# Patient Record
Sex: FEMALE | Race: OTHER | URBAN - METROPOLITAN AREA
[De-identification: names, ages, dates, MRNs, and addresses within clinical notes are randomized per-mention and may not be internally consistent; named-entity substitution may affect disease eponyms.]

---

## 2023-07-28 ENCOUNTER — INPATIENT (INPATIENT)
Facility: HOSPITAL | Age: 68
LOS: 1 days | Discharge: HOME | DRG: 872 | End: 2023-07-30
Attending: STUDENT IN AN ORGANIZED HEALTH CARE EDUCATION/TRAINING PROGRAM | Admitting: STUDENT IN AN ORGANIZED HEALTH CARE EDUCATION/TRAINING PROGRAM
Payer: COMMERCIAL

## 2023-07-28 VITALS
DIASTOLIC BLOOD PRESSURE: 69 MMHG | RESPIRATION RATE: 18 BRPM | HEART RATE: 90 BPM | SYSTOLIC BLOOD PRESSURE: 105 MMHG | WEIGHT: 136.03 LBS | OXYGEN SATURATION: 97 % | TEMPERATURE: 98 F | HEIGHT: 62 IN

## 2023-07-28 DIAGNOSIS — Z90.49 ACQUIRED ABSENCE OF OTHER SPECIFIED PARTS OF DIGESTIVE TRACT: Chronic | ICD-10-CM

## 2023-07-28 DIAGNOSIS — F41.9 ANXIETY DISORDER, UNSPECIFIED: ICD-10-CM

## 2023-07-28 DIAGNOSIS — R10.9 UNSPECIFIED ABDOMINAL PAIN: ICD-10-CM

## 2023-07-28 DIAGNOSIS — N12 TUBULO-INTERSTITIAL NEPHRITIS, NOT SPECIFIED AS ACUTE OR CHRONIC: ICD-10-CM

## 2023-07-28 DIAGNOSIS — A41.9 SEPSIS, UNSPECIFIED ORGANISM: ICD-10-CM

## 2023-07-28 DIAGNOSIS — Z98.891 HISTORY OF UTERINE SCAR FROM PREVIOUS SURGERY: Chronic | ICD-10-CM

## 2023-07-28 DIAGNOSIS — E87.1 HYPO-OSMOLALITY AND HYPONATREMIA: ICD-10-CM

## 2023-07-28 DIAGNOSIS — Z29.9 ENCOUNTER FOR PROPHYLACTIC MEASURES, UNSPECIFIED: ICD-10-CM

## 2023-07-28 DIAGNOSIS — N30.00 ACUTE CYSTITIS WITHOUT HEMATURIA: ICD-10-CM

## 2023-07-28 LAB
ALBUMIN SERPL ELPH-MCNC: 3.8 G/DL — SIGNIFICANT CHANGE UP (ref 3.3–5)
ALP SERPL-CCNC: 73 U/L — SIGNIFICANT CHANGE UP (ref 40–120)
ALT FLD-CCNC: 13 U/L — SIGNIFICANT CHANGE UP (ref 10–45)
ANION GAP SERPL CALC-SCNC: 12 MMOL/L — SIGNIFICANT CHANGE UP (ref 5–17)
ANION GAP SERPL CALC-SCNC: 8 MMOL/L — SIGNIFICANT CHANGE UP (ref 5–17)
ANISOCYTOSIS BLD QL: SLIGHT — SIGNIFICANT CHANGE UP
APPEARANCE UR: ABNORMAL
AST SERPL-CCNC: 22 U/L — SIGNIFICANT CHANGE UP (ref 10–40)
BACTERIA # UR AUTO: ABNORMAL /HPF
BASOPHILS # BLD AUTO: 0.13 K/UL — SIGNIFICANT CHANGE UP (ref 0–0.2)
BASOPHILS NFR BLD AUTO: 0.9 % — SIGNIFICANT CHANGE UP (ref 0–2)
BILIRUB SERPL-MCNC: 0.6 MG/DL — SIGNIFICANT CHANGE UP (ref 0.2–1.2)
BILIRUB UR-MCNC: NEGATIVE — SIGNIFICANT CHANGE UP
BUN SERPL-MCNC: 9 MG/DL — SIGNIFICANT CHANGE UP (ref 7–23)
BUN SERPL-MCNC: 9 MG/DL — SIGNIFICANT CHANGE UP (ref 7–23)
BURR CELLS BLD QL SMEAR: PRESENT — SIGNIFICANT CHANGE UP
CALCIUM SERPL-MCNC: 8.1 MG/DL — LOW (ref 8.4–10.5)
CALCIUM SERPL-MCNC: 8.8 MG/DL — SIGNIFICANT CHANGE UP (ref 8.4–10.5)
CHLORIDE SERPL-SCNC: 109 MMOL/L — HIGH (ref 96–108)
CHLORIDE SERPL-SCNC: 94 MMOL/L — LOW (ref 96–108)
CO2 SERPL-SCNC: 23 MMOL/L — SIGNIFICANT CHANGE UP (ref 22–31)
CO2 SERPL-SCNC: 23 MMOL/L — SIGNIFICANT CHANGE UP (ref 22–31)
COLOR SPEC: YELLOW — SIGNIFICANT CHANGE UP
CREAT SERPL-MCNC: 0.75 MG/DL — SIGNIFICANT CHANGE UP (ref 0.5–1.3)
CREAT SERPL-MCNC: 0.89 MG/DL — SIGNIFICANT CHANGE UP (ref 0.5–1.3)
DIFF PNL FLD: ABNORMAL
EGFR: 70 ML/MIN/1.73M2 — SIGNIFICANT CHANGE UP
EGFR: 86 ML/MIN/1.73M2 — SIGNIFICANT CHANGE UP
EOSINOPHIL # BLD AUTO: 0 K/UL — SIGNIFICANT CHANGE UP (ref 0–0.5)
EOSINOPHIL NFR BLD AUTO: 0 % — SIGNIFICANT CHANGE UP (ref 0–6)
EPI CELLS # UR: SIGNIFICANT CHANGE UP /HPF (ref 0–5)
GIANT PLATELETS BLD QL SMEAR: PRESENT — SIGNIFICANT CHANGE UP
GLUCOSE SERPL-MCNC: 102 MG/DL — HIGH (ref 70–99)
GLUCOSE SERPL-MCNC: 121 MG/DL — HIGH (ref 70–99)
GLUCOSE UR QL: NEGATIVE — SIGNIFICANT CHANGE UP
HCT VFR BLD CALC: 36.1 % — SIGNIFICANT CHANGE UP (ref 34.5–45)
HGB BLD-MCNC: 12.3 G/DL — SIGNIFICANT CHANGE UP (ref 11.5–15.5)
KETONES UR-MCNC: NEGATIVE — SIGNIFICANT CHANGE UP
LACTATE SERPL-SCNC: 1.9 MMOL/L — SIGNIFICANT CHANGE UP (ref 0.5–2)
LEUKOCYTE ESTERASE UR-ACNC: ABNORMAL
LIDOCAIN IGE QN: 21 U/L — SIGNIFICANT CHANGE UP (ref 7–60)
LYMPHOCYTES # BLD AUTO: 15.8 % — SIGNIFICANT CHANGE UP (ref 13–44)
LYMPHOCYTES # BLD AUTO: 2.32 K/UL — SIGNIFICANT CHANGE UP (ref 1–3.3)
MACROCYTES BLD QL: SLIGHT — SIGNIFICANT CHANGE UP
MANUAL SMEAR VERIFICATION: SIGNIFICANT CHANGE UP
MCHC RBC-ENTMCNC: 32.3 PG — SIGNIFICANT CHANGE UP (ref 27–34)
MCHC RBC-ENTMCNC: 34.1 GM/DL — SIGNIFICANT CHANGE UP (ref 32–36)
MCV RBC AUTO: 94.8 FL — SIGNIFICANT CHANGE UP (ref 80–100)
MONOCYTES # BLD AUTO: 2.32 K/UL — HIGH (ref 0–0.9)
MONOCYTES NFR BLD AUTO: 15.8 % — HIGH (ref 2–14)
NEUTROPHILS # BLD AUTO: 9.9 K/UL — HIGH (ref 1.8–7.4)
NEUTROPHILS NFR BLD AUTO: 67.5 % — SIGNIFICANT CHANGE UP (ref 43–77)
NITRITE UR-MCNC: NEGATIVE — SIGNIFICANT CHANGE UP
OSMOLALITY SERPL: 285 MOSM/KG — SIGNIFICANT CHANGE UP (ref 280–301)
OVALOCYTES BLD QL SMEAR: SLIGHT — SIGNIFICANT CHANGE UP
PH UR: 6 — SIGNIFICANT CHANGE UP (ref 5–8)
PLAT MORPH BLD: ABNORMAL
PLATELET # BLD AUTO: 254 K/UL — SIGNIFICANT CHANGE UP (ref 150–400)
POIKILOCYTOSIS BLD QL AUTO: SLIGHT — SIGNIFICANT CHANGE UP
POTASSIUM SERPL-MCNC: 4.4 MMOL/L — SIGNIFICANT CHANGE UP (ref 3.5–5.3)
POTASSIUM SERPL-MCNC: 4.5 MMOL/L — SIGNIFICANT CHANGE UP (ref 3.5–5.3)
POTASSIUM SERPL-SCNC: 4.4 MMOL/L — SIGNIFICANT CHANGE UP (ref 3.5–5.3)
POTASSIUM SERPL-SCNC: 4.5 MMOL/L — SIGNIFICANT CHANGE UP (ref 3.5–5.3)
PROT SERPL-MCNC: 7.5 G/DL — SIGNIFICANT CHANGE UP (ref 6–8.3)
PROT UR-MCNC: 30 MG/DL
RBC # BLD: 3.81 M/UL — SIGNIFICANT CHANGE UP (ref 3.8–5.2)
RBC # FLD: 13 % — SIGNIFICANT CHANGE UP (ref 10.3–14.5)
RBC BLD AUTO: ABNORMAL
RBC CASTS # UR COMP ASSIST: < 5 /HPF — SIGNIFICANT CHANGE UP
SODIUM SERPL-SCNC: 129 MMOL/L — LOW (ref 135–145)
SODIUM SERPL-SCNC: 140 MMOL/L — SIGNIFICANT CHANGE UP (ref 135–145)
SP GR SPEC: 1.01 — SIGNIFICANT CHANGE UP (ref 1–1.03)
UROBILINOGEN FLD QL: 2 E.U./DL
WBC # BLD: 14.67 K/UL — HIGH (ref 3.8–10.5)
WBC # FLD AUTO: 14.67 K/UL — HIGH (ref 3.8–10.5)
WBC UR QL: ABNORMAL /HPF

## 2023-07-28 PROCEDURE — 99285 EMERGENCY DEPT VISIT HI MDM: CPT

## 2023-07-28 PROCEDURE — 74177 CT ABD & PELVIS W/CONTRAST: CPT | Mod: 26,MA

## 2023-07-28 PROCEDURE — 99222 1ST HOSP IP/OBS MODERATE 55: CPT | Mod: GC

## 2023-07-28 RX ORDER — FLUOXETINE HCL 10 MG
20 CAPSULE ORAL DAILY
Refills: 0 | Status: DISCONTINUED | OUTPATIENT
Start: 2023-07-28 | End: 2023-07-30

## 2023-07-28 RX ORDER — CEFTRIAXONE 500 MG/1
1000 INJECTION, POWDER, FOR SOLUTION INTRAMUSCULAR; INTRAVENOUS ONCE
Refills: 0 | Status: COMPLETED | OUTPATIENT
Start: 2023-07-28 | End: 2023-07-28

## 2023-07-28 RX ORDER — SODIUM CHLORIDE 9 MG/ML
1000 INJECTION INTRAMUSCULAR; INTRAVENOUS; SUBCUTANEOUS ONCE
Refills: 0 | Status: COMPLETED | OUTPATIENT
Start: 2023-07-28 | End: 2023-07-28

## 2023-07-28 RX ORDER — ACETAMINOPHEN 500 MG
650 TABLET ORAL EVERY 6 HOURS
Refills: 0 | Status: DISCONTINUED | OUTPATIENT
Start: 2023-07-28 | End: 2023-07-30

## 2023-07-28 RX ORDER — CEFTRIAXONE 500 MG/1
1000 INJECTION, POWDER, FOR SOLUTION INTRAMUSCULAR; INTRAVENOUS EVERY 24 HOURS
Refills: 0 | Status: DISCONTINUED | OUTPATIENT
Start: 2023-07-29 | End: 2023-07-30

## 2023-07-28 RX ORDER — ACETAMINOPHEN 500 MG
1000 TABLET ORAL ONCE
Refills: 0 | Status: COMPLETED | OUTPATIENT
Start: 2023-07-28 | End: 2023-07-28

## 2023-07-28 RX ORDER — IBUPROFEN 200 MG
600 TABLET ORAL ONCE
Refills: 0 | Status: COMPLETED | OUTPATIENT
Start: 2023-07-28 | End: 2023-07-28

## 2023-07-28 RX ORDER — ENOXAPARIN SODIUM 100 MG/ML
40 INJECTION SUBCUTANEOUS EVERY 24 HOURS
Refills: 0 | Status: DISCONTINUED | OUTPATIENT
Start: 2023-07-28 | End: 2023-07-30

## 2023-07-28 RX ORDER — SODIUM CHLORIDE 9 MG/ML
1000 INJECTION, SOLUTION INTRAVENOUS ONCE
Refills: 0 | Status: COMPLETED | OUTPATIENT
Start: 2023-07-28 | End: 2023-07-28

## 2023-07-28 RX ORDER — METRONIDAZOLE 500 MG
500 TABLET ORAL ONCE
Refills: 0 | Status: COMPLETED | OUTPATIENT
Start: 2023-07-28 | End: 2023-07-28

## 2023-07-28 RX ORDER — IOHEXOL 300 MG/ML
30 INJECTION, SOLUTION INTRAVENOUS ONCE
Refills: 0 | Status: COMPLETED | OUTPATIENT
Start: 2023-07-28 | End: 2023-07-28

## 2023-07-28 RX ADMIN — SODIUM CHLORIDE 1000 MILLILITER(S): 9 INJECTION, SOLUTION INTRAVENOUS at 14:27

## 2023-07-28 RX ADMIN — SODIUM CHLORIDE 1000 MILLILITER(S): 9 INJECTION INTRAMUSCULAR; INTRAVENOUS; SUBCUTANEOUS at 13:12

## 2023-07-28 RX ADMIN — Medication 600 MILLIGRAM(S): at 15:18

## 2023-07-28 RX ADMIN — SODIUM CHLORIDE 1000 MILLILITER(S): 9 INJECTION INTRAMUSCULAR; INTRAVENOUS; SUBCUTANEOUS at 11:24

## 2023-07-28 RX ADMIN — Medication 400 MILLIGRAM(S): at 11:24

## 2023-07-28 RX ADMIN — Medication 100 MILLIGRAM(S): at 14:36

## 2023-07-28 RX ADMIN — SODIUM CHLORIDE 1000 MILLILITER(S): 9 INJECTION, SOLUTION INTRAVENOUS at 15:18

## 2023-07-28 RX ADMIN — CEFTRIAXONE 1000 MILLIGRAM(S): 500 INJECTION, POWDER, FOR SOLUTION INTRAMUSCULAR; INTRAVENOUS at 14:55

## 2023-07-28 RX ADMIN — SODIUM CHLORIDE 1000 MILLILITER(S): 9 INJECTION INTRAMUSCULAR; INTRAVENOUS; SUBCUTANEOUS at 11:23

## 2023-07-28 RX ADMIN — Medication 1000 MILLIGRAM(S): at 13:11

## 2023-07-28 RX ADMIN — Medication 500 MILLIGRAM(S): at 15:18

## 2023-07-28 RX ADMIN — IOHEXOL 30 MILLILITER(S): 300 INJECTION, SOLUTION INTRAVENOUS at 11:24

## 2023-07-28 RX ADMIN — Medication 600 MILLIGRAM(S): at 14:27

## 2023-07-28 RX ADMIN — CEFTRIAXONE 100 MILLIGRAM(S): 500 INJECTION, POWDER, FOR SOLUTION INTRAMUSCULAR; INTRAVENOUS at 13:58

## 2023-07-28 NOTE — H&P ADULT - HISTORY OF PRESENT ILLNESS
68 F with no PMHx presenting with 3 days of abd pain associated with diarrhea and fatigue found to have uretritis and pyelonephritis. Three days ago, patient developed diffuse intermittent crampy pain associated with lower back pain that does not radiate to either side. She had two episodes of non-bloody diarrhea that resolved with immodium. She felt feverish at home and took her temperature to 38C-39C (100.4-102.2F). More recently developed fatigue and decreased appetite. She went to her PCP Dr. Arie Domínguez who sent her to ED for evaluation.  Denies chest pain, SOB, N/V, dysuria, urinary frequency, sick contacts.    Of note, patient moved from [facility] to [facility] one year ago and was prescribed alprazolam 0.25 qAM and fluoxetine 20mg qD in [place] but does not have an official diagnosis of depression. She also has history of kidney infection at age 13.    In the ED:  Initial vital signs: T: 98.3, 105/69, HR 90, RR 18, 97% on RA. Later BP 90/55 and T100.6  Labs: significant for WBC 14.7, Hgb 12.3, Na 129, Cr 0.89, lactate 1.9, UA infectious  Imaging: CT with R ureteritis/pyelonephritis, incidental L renal angiomylipoma  Medications: CTX x1, flagyl x1, IV tylenol, motrin, 2L NS, 1L LR  Consults: none    68 F with no PMHx presenting with 3 days of abd pain associated with diarrhea and fatigue found to have uretritis and pyelonephritis. Three days ago, patient developed diffuse intermittent crampy pain associated with lower back pain that does not radiate to either side. She had two episodes of non-bloody diarrhea that resolved with immodium. She felt feverish at home and took her temperature to 38C-39C (100.4-102.2F). More recently developed fatigue and decreased appetite. She went to her PCP Dr. Arie Domínguez who sent her to ED for evaluation.  Denies chest pain, SOB, N/V, dysuria, urinary frequency, sick contacts.    Of note, patient moved from [facility] to [facility] one year ago and was prescribed alprazolam 0.25 qAM and fluoxetine 20mg qD in [place] but does not have an official diagnosis of depression. She also has history of kidney infection at age 13.    In the ED:  Initial vital signs: T: 98.3, 105/69, HR 90, RR 18, 97% on RA. Later BP 90/55 and T100.6  Labs: significant for WBC 14.7, Hgb 12.3, Na 129, Cr 0.89, lactate 1.9, UA infectious  Imaging: CT with R ureteritis/pyelonephritis, incidental L renal angiomylipoma  Medications: CTX 1g x1, flagyl x1, IV tylenol, motrin, 2L NS, 1L LR  Consults: none    68 F with no PMHx presenting with 3 days of abd pain associated with diarrhea and fatigue found to have uretritis and pyelonephritis. Three days ago, patient developed diffuse intermittent crampy pain associated with lower back pain that does not radiate to either side. She had two episodes of non-bloody diarrhea that resolved with immodium. She felt feverish at home and took her temperature to 38C-39C (100.4-102.2F). More recently developed fatigue and decreased appetite. She went to her PCP Dr. Arie Domínguez who sent her to ED for evaluation.  Denies chest pain, SOB, N/V, dysuria, urinary frequency, sick contacts.    Of note, patient moved from [facility] to [facility] one year ago and was prescribed alprazolam 0.25 qAM and fluoxetine 20mg qD in [place] but does not have an official diagnosis of depression. She also has history of kidney infection at age 13. Per patient, she has history of soft blood pressures.    In the ED:  Initial vital signs: T: 98.3, 105/69, HR 90, RR 18, 97% on RA. Later BP 90/55 and T100.6  Labs: significant for WBC 14.7, Hgb 12.3, Na 129, Cr 0.89, lactate 1.9, UA infectious  Imaging: CT with R ureteritis/pyelonephritis, incidental L renal angiomylipoma  Medications: CTX 1g x1, flagyl x1, IV tylenol, motrin, 2L NS, 1L LR  Consults: none

## 2023-07-28 NOTE — H&P ADULT - PROBLEM SELECTOR PLAN 3
Na 127 on admission. Patient reporting poor PO intake. Euvolemic exam. Calculated sOsms 268, c/w hypotonic hyponatremia. Ddx: hypovolemic iso poor PO intake vs. SIADH.     Plan:  - F/u urine osms, urine lytes, serum osms  - F/u repeat BMP after IVF in ED Na 127 on admission. Patient reporting poor PO intake. Euvolemic exam. Calculated sOsms 268, c/w hypotonic hyponatremia. Ddx: hypovolemic iso poor PO intake vs. SIADH.     Plan:  - F/u urine osms, urine lytes  - F/u repeat 6p BMP and serum osms after IVF in ED Na 127 on admission. Patient reporting poor PO intake. Euvolemic exam. Calculated sOsms 268, c/w hypotonic hyponatremia. Ddx: hypovolemic iso poor PO intake and diarrheal losses vs. SIADH (can be associated with fluoxetine).     Plan:  - F/u urine osms, urine lytes  - F/u repeat 6p BMP and serum osms after IVF in ED Na 127 on admission. Patient reporting poor PO intake. Euvolemic exam. Calculated sOsms 268, c/w hypotonic hyponatremia. Ddx: hypovolemic iso poor PO intake and diarrheal losses vs. SIADH (can be associated with fluoxetine).     Plan:  - F/u urine osms, urine lytes  - F/u repeat 8p BMP and serum osms after IVF in ED

## 2023-07-28 NOTE — ED ADULT NURSE NOTE - NS ED NURSE REPORT GIVEN TO FT
Geno SHARMA erho/ report given to Melina SHARMA by Chely SHARMA Mirvaso Counseling: Mirvaso is a topical medication which can decrease superficial blood flow where applied. Side effects are uncommon and include stinging, redness and allergic reactions.

## 2023-07-28 NOTE — ED PROVIDER NOTE - OBJECTIVE STATEMENT
68 year old female, no reported pmh,  x 2, presenting with lower abdominal pain x 3d. States having b/l lower quadrant pain with associated low back pain, fever, and diarrhea. Denies CP, cough, sob, n/v, bloody stools, recent travel, sick contacts, dysuria. Did not take anything PTA. Was seen at PMD's office and told to come to ER to r/o diverticulitis.

## 2023-07-28 NOTE — H&P ADULT - PROBLEM SELECTOR PLAN 4
Patient with infectious UA but no urinary symptoms.    Plan:  -See above Patient with 3 days of crampy intermittent abdominal pain, assoc with lower back pain and R>L CVA tenderness. Most likely 2/2 to pyelonephritis. Lower concern for: pancreatitis (negative lipase), cystitis (no urinary symptoms, no suprapubic tenderness), kidney stone (not seen on imaging).     Plan:  - See above

## 2023-07-28 NOTE — H&P ADULT - NSHPSOCIALHISTORY_GEN_ALL_CORE
Traveled from [place] to [place] one year prior to be with her kids/grand kids.  Lives alone  Recently started working again in fashion    Non-smoker, social EtOH, no drugs

## 2023-07-28 NOTE — ED PROVIDER NOTE - ATTENDING APP SHARED VISIT CONTRIBUTION OF CARE
I have reviewed and agree with all pertinent clinical information, including history and physical exam and agree with treatment plan of the PA/NP    I saw the patient at bedside and contributed to the HPI/PE/MDM

## 2023-07-28 NOTE — H&P ADULT - NSHPPHYSICALEXAM_GEN_ALL_CORE
.  VITAL SIGNS:  T(C): 36.7 (07-28-23 @ 15:32), Max: 38.1 (07-28-23 @ 13:54)  T(F): 98.1 (07-28-23 @ 15:32), Max: 100.6 (07-28-23 @ 13:54)  HR: 86 (07-28-23 @ 13:48) (86 - 90)  BP: 96/60 (07-28-23 @ 14:38) (90/55 - 105/69)  BP(mean): --  RR: 18 (07-28-23 @ 13:48) (18 - 18)  SpO2: 98% (07-28-23 @ 13:48) (97% - 98%)  Wt(kg): --    PHYSICAL EXAM:    Constitutional: NAD  Eyes: PERRL, EOMI, anicteric sclera  ENT: no nasal discharge; uvula midline, no oropharyngeal erythema or exudates; MMM  Neck: supple; no JVD or thyromegaly  Respiratory: CTA B/L; normal wob, no retractions  Cardiac: +S1/S2; RRR; no M/R/G; PMI non-displaced  Gastrointestinal: soft, mild diffuse tenderness in epigastrium  Back: spine midline, no bony tenderness or step-offs; R>L CVA tenderness  Extremities: WWP, no clubbing or cyanosis; no peripheral edema  Musculoskeletal: NROM x4; no joint swelling, tenderness or erythema  Vascular: 2+ radial, femoral, DP/PT pulses B/L  Dermatologic: skin warm, dry and intact; no rashes, wounds, or scars  Lymphatic: no submandibular or cervical LAD  Neurologic: AAOx3; CNII-XII grossly intact; no focal deficits  Psychiatric: affect and characteristics of appearance, verbalizations, behaviors are appropriate

## 2023-07-28 NOTE — ED ADULT NURSE NOTE - PAIN: BODY LOCATION
abdominal pain/generalized
no back pain, no gout, no musculoskeletal pain, no neck pain, and no weakness.

## 2023-07-28 NOTE — ED ADULT NURSE NOTE - OBJECTIVE STATEMENT
Pt is 68 y.o female pt walked in c/o generalized abdominal pain, fever, diarrhea x 3 days. Denies chest pain, shortness of breath, dizziness, n/v/d. Pt afebrile upon arrival. Pt A&oX4. Pt is conversive in full sentences and has a steady gait. IV inserted and labs sent. Denies any recent or past abdominal surgery aside for C- section. Assessment ongoing. Will cont to monitor.

## 2023-07-28 NOTE — ED PROVIDER NOTE - PROGRESS NOTE DETAILS
pt meeting sepsis criteria as now febrile, bp low 90/50s. already received wt based fluiids but no contraindication to more hydration.  already given ceftriaxone for uti and iv tylenol for pain.  pending CTr.   will give motrin and obtain cultures as well pt meeting sepsis criteria as now febrile, bp low 90/50s.  neg lactate.  already received wt based fluids but no contraindication to more hydration.  already given ceftriaxone for uti and iv tylenol for pain.  will give motrin, flagyl and obtain cultures pending CTr. CT w/ R ureteritis/pyelo, incidental L renal angiomylipoma.  will admit to medicine for further management

## 2023-07-28 NOTE — ED ADULT NURSE NOTE - NSFALLUNIVINTERV_ED_ALL_ED
Bed/Stretcher in lowest position, wheels locked, appropriate side rails in place/Call bell, personal items and telephone in reach/Instruct patient to call for assistance before getting out of bed/chair/stretcher/Non-slip footwear applied when patient is off stretcher/[place] to call system/Physically safe environment - no spills, clutter or unnecessary equipment/Purposeful proactive rounding/Room/bathroom lighting operational, light cord in reach

## 2023-07-28 NOTE — H&P ADULT - PROBLEM SELECTOR PLAN 7
Fluids: S/p 3L IVF in ED  Electrolytes: replete to K>4 or Mg>2  Diet: dash  DVT PPx: Lovenox 40mg QD  GI PPx:   Code: Full  Dispo: [facility] then home

## 2023-07-28 NOTE — H&P ADULT - NSHPLABSRESULTS_GEN_ALL_CORE
LABS:                         12.3   14.67 )-----------( 254      ( 28 Jul 2023 11:28 )             36.1     07-28    129<L>  |  94<L>  |  9   ----------------------------<  121<H>  4.4   |  23  |  0.89    Ca    8.8      28 Jul 2023 11:28    TPro  7.5  /  Alb  3.8  /  TBili  0.6  /  DBili  x   /  AST  22  /  ALT  13  /  AlkPhos  73  07-28      Urinalysis Basic - ( 28 Jul 2023 11:28 )    Color: x / Appearance: x / SG: x / pH: x  Gluc: 121 mg/dL / Ketone: x  / Bili: x / Urobili: x   Blood: x / Protein: x / Nitrite: x   Leuk Esterase: x / RBC: x / WBC x   Sq Epi: x / Non Sq Epi: x / Bacteria: x            Lactate, Blood: 1.9 mmol/L (07-28 @ 11:28)      RADIOLOGY, EKG & ADDITIONAL TESTS:   IMPRESSION:  1. Acute right pyelonephritis and ureteritis. No abscess.  2. 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.) LABS:                         12.3   14.67 )-----------( 254      ( 28 Jul 2023 11:28 )             36.1     07-28    129<L>  |  94<L>  |  9   ----------------------------<  121<H>  4.4   |  23  |  0.89    Ca    8.8      28 Jul 2023 11:28    TPro  7.5  /  Alb  3.8  /  TBili  0.6  /  DBili  x   /  AST  22  /  ALT  13  /  AlkPhos  73  07-28      Urinalysis Basic - ( 28 Jul 2023 11:28 )    Color: x / Appearance: x / SG: x / pH: x  Gluc: 121 mg/dL / Ketone: x  / Bili: x / Urobili: x   Blood: x / Protein: x / Nitrite: x   Leuk Esterase: x / RBC: x / WBC x   Sq Epi: x / Non Sq Epi: x / Bacteria: x      Lactate, Blood: 1.9 mmol/L (07-28 @ 11:28)      RADIOLOGY, EKG & ADDITIONAL TESTS:   IMPRESSION:  1. Acute right pyelonephritis and ureteritis. No abscess.  2. 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

## 2023-07-28 NOTE — ED PROVIDER NOTE - CLINICAL SUMMARY MEDICAL DECISION MAKING FREE TEXT BOX
MD Deni: 68 year old female, no reported pmh,  x 2, presenting with lower abdominal pain x 3d, BP mildly soft here but vitals otherwise reassuring, patient is very well appearing somewhat emotional/labile in setting of anxiety, mentating fully overall, good perfusion throughout, abdomen soft/non-peritoneal despite tenderness to palpation in lower quadrants. Will need CT imaging to r/o acute diverticulitis vs colitis vs abscess, low suspicion for obstruction vs cholecystitis vs pancreatitis vs ACS vs PE. Likely viral in etiology. Will reassess pending labs/urine/imaging. Doubt surgical pathology.

## 2023-07-28 NOTE — H&P ADULT - PROBLEM SELECTOR PLAN 2
Patient with R>L CVA tenderness and pyelonephritis on imaging.     Plan:  -See above  -Will need outpatient f/u for incidental L renal angiomylipoma

## 2023-07-28 NOTE — ED ADULT NURSE NOTE - CHPI ED NUR SYMPTOMS POS
What Is The Reason For Today's Visit?: History of Melanoma Year Excised?: 2003 [place] Depth?: 0.69 PAIN

## 2023-07-28 NOTE — ED PROVIDER NOTE - PHYSICAL EXAMINATION
Gen - NAD; well-appearing but anxious/tearful at times; A+Ox3   HEENT - NCAT, EOMI, moist mucous membranes, clear oropharynx  Neck - supple  Resp - CTAB, no increased WOB  CV -  RRR, no m/r/g  Abd - soft, nondistended, TTP b/l lower quadrants, no guarding or rebound  Back - inconsistent R>L CVA tenderness  MSK - FROM of b/l UE and LE, no gross deformities  Extrem - no LE edema/erythema/tenderness  Neuro - no focal motor or sensation deficits  Skin - warm, well perfused

## 2023-07-28 NOTE — H&P ADULT - PROBLEM SELECTOR PLAN 1
Patient meets 2/4 SIRS criteria with T100.6 and WB 14.7. Does not meet criteria for severe sepsis given lactate 1.9 and no signs of end-organ perfusion on exam. Most likely source is pyelonephritis vs. cystitis/ureteritis. S/p 3L fluids in ED.    Plan:  - C/w CTX 1g qd for empiric pyelonephritis  - F/u Bcx x2, Ucx  - Tylenol 650 q6 prn for pain/fever Patient meets 2/4 SIRS criteria with T100.6 and WB 14.7. Does not meet criteria for severe sepsis given lactate 1.9, no signs of end-organ perfusion on exam, pressures >90/60. Most likely source is pyelonephritis vs. cystitis/ureteritis. S/p 3L fluids in ED.    Plan:  - C/w CTX 1g qd for empiric pyelonephritis  - F/u Bcx x2, Ucx  - Tylenol 650 q6 prn for pain/fever Patient meets 2/4 SIRS criteria with T100.6 and WB 14.7. Does not meet criteria for severe sepsis given lactate 1.9, no signs of end-organ perfusion on exam, pressures >90/60. Most likely source is pyelonephritis vs. cystitis/ureteritis. S/p 3L fluids in ED.    Plan:  - C/w CTX 1g qd for empiric pyelonephritis vs. complicated cystitis  - F/u Bcx x2, Ucx  - Tylenol 650 q6 prn for pain/fever

## 2023-07-28 NOTE — H&P ADULT - ATTENDING COMMENTS
PCP: Dr. Domínguez  68F w last UTI while teenager p/w abd pain, diarrhea, fever found to have R pyelonephritis w ureteritis    Pt reports diarrhea, decreased PO intake, fever at home. 100.6 in ED. Denies any abx use.   Exam: female in NAD on RA, MMM, RRR, nml resp effort, CTAB, Abd soft, NABS, non-tender, no CVA tenderness. A/O x3, moving all ext    #R Pyelonephritis - WBC 14. +UA. CT A/P - pyelo w R ureteritis   - CTX, Flagyl  #Depression - fluoxetine  #Hyponatremia - 129 - poss from decreased PO intake   - Urine Na, Cr,    PPx SQH  Plan  IV CTX 1g q24h x5-7d  f/u UCx and sensitivities, BCx    DISPO: Home - anticipate 24-48h

## 2023-07-28 NOTE — H&P ADULT - PROBLEM SELECTOR PLAN 5
Patient does not have formal diagnosis but was prescribed fluoxetine 20mg qd and alprazolam 0.25 qAM in setting of family stressors.     Plan:  - C/w fluoxetine 20mg qd Patient with infectious UA but no urinary symptoms.    Plan:  -See above

## 2023-07-28 NOTE — ED ADULT TRIAGE NOTE - CHIEF COMPLAINT QUOTE
Pt presents to ED c/o generalized abdominal pain, fever, diarrhea x 3 days. denies chest pain, shortness of breath, dizziness.

## 2023-07-28 NOTE — H&P ADULT - PROBLEM SELECTOR PLAN 6
Fluids: S/p 3L IVF in ED  Electrolytes: replete to K>4 or Mg>2  Diet: dash  DVT PPx: Lovenox 40mg QD  GI PPx:   Code: Full  Dispo: [facility] then home Patient does not have formal diagnosis but was prescribed fluoxetine 20mg qd and alprazolam 0.25 qAM in setting of family stressors.     Plan:  - C/w fluoxetine 20mg qd Patient does not have formal diagnosis but was prescribed fluoxetine 20mg qd and alprazolam 0.25 qAM in setting of family stressors.     Plan:  - C/w fluoxetine 20mg qd  - Can give prn alprazolam if requested

## 2023-07-28 NOTE — H&P ADULT - ASSESSMENT
68F with depression presenting with 3 days of abd pain, fever, diarrhea with sepsis and R pyelonephritis/ureteritis now admitted to regional medical floor for IV abx management with likely transition to PO abx pending clinical improvement.

## 2023-07-29 LAB
ALBUMIN SERPL ELPH-MCNC: 3 G/DL — LOW (ref 3.3–5)
ALP SERPL-CCNC: 70 U/L — SIGNIFICANT CHANGE UP (ref 40–120)
ALT FLD-CCNC: 11 U/L — SIGNIFICANT CHANGE UP (ref 10–45)
ANION GAP SERPL CALC-SCNC: 9 MMOL/L — SIGNIFICANT CHANGE UP (ref 5–17)
AST SERPL-CCNC: 16 U/L — SIGNIFICANT CHANGE UP (ref 10–40)
BASOPHILS # BLD AUTO: 0.03 K/UL — SIGNIFICANT CHANGE UP (ref 0–0.2)
BASOPHILS NFR BLD AUTO: 0.2 % — SIGNIFICANT CHANGE UP (ref 0–2)
BILIRUB SERPL-MCNC: 0.2 MG/DL — SIGNIFICANT CHANGE UP (ref 0.2–1.2)
BUN SERPL-MCNC: 11 MG/DL — SIGNIFICANT CHANGE UP (ref 7–23)
CALCIUM SERPL-MCNC: 8.4 MG/DL — SIGNIFICANT CHANGE UP (ref 8.4–10.5)
CHLORIDE SERPL-SCNC: 108 MMOL/L — SIGNIFICANT CHANGE UP (ref 96–108)
CO2 SERPL-SCNC: 20 MMOL/L — LOW (ref 22–31)
CREAT SERPL-MCNC: 0.83 MG/DL — SIGNIFICANT CHANGE UP (ref 0.5–1.3)
EGFR: 76 ML/MIN/1.73M2 — SIGNIFICANT CHANGE UP
EOSINOPHIL # BLD AUTO: 0.08 K/UL — SIGNIFICANT CHANGE UP (ref 0–0.5)
EOSINOPHIL NFR BLD AUTO: 0.7 % — SIGNIFICANT CHANGE UP (ref 0–6)
GLUCOSE SERPL-MCNC: 104 MG/DL — HIGH (ref 70–99)
HCT VFR BLD CALC: 31.7 % — LOW (ref 34.5–45)
HCV AB S/CO SERPL IA: 0.04 S/CO — SIGNIFICANT CHANGE UP
HCV AB SERPL-IMP: SIGNIFICANT CHANGE UP
HGB BLD-MCNC: 10.3 G/DL — LOW (ref 11.5–15.5)
IMM GRANULOCYTES NFR BLD AUTO: 0.4 % — SIGNIFICANT CHANGE UP (ref 0–0.9)
LYMPHOCYTES # BLD AUTO: 17 % — SIGNIFICANT CHANGE UP (ref 13–44)
LYMPHOCYTES # BLD AUTO: 2.06 K/UL — SIGNIFICANT CHANGE UP (ref 1–3.3)
MAGNESIUM SERPL-MCNC: 1.9 MG/DL — SIGNIFICANT CHANGE UP (ref 1.6–2.6)
MCHC RBC-ENTMCNC: 31.9 PG — SIGNIFICANT CHANGE UP (ref 27–34)
MCHC RBC-ENTMCNC: 32.5 GM/DL — SIGNIFICANT CHANGE UP (ref 32–36)
MCV RBC AUTO: 98.1 FL — SIGNIFICANT CHANGE UP (ref 80–100)
MONOCYTES # BLD AUTO: 1.57 K/UL — HIGH (ref 0–0.9)
MONOCYTES NFR BLD AUTO: 13 % — SIGNIFICANT CHANGE UP (ref 2–14)
NEUTROPHILS # BLD AUTO: 8.3 K/UL — HIGH (ref 1.8–7.4)
NEUTROPHILS NFR BLD AUTO: 68.7 % — SIGNIFICANT CHANGE UP (ref 43–77)
NRBC # BLD: 0 /100 WBCS — SIGNIFICANT CHANGE UP (ref 0–0)
PHOSPHATE SERPL-MCNC: 2.9 MG/DL — SIGNIFICANT CHANGE UP (ref 2.5–4.5)
PLATELET # BLD AUTO: 234 K/UL — SIGNIFICANT CHANGE UP (ref 150–400)
POTASSIUM SERPL-MCNC: 4.5 MMOL/L — SIGNIFICANT CHANGE UP (ref 3.5–5.3)
POTASSIUM SERPL-SCNC: 4.5 MMOL/L — SIGNIFICANT CHANGE UP (ref 3.5–5.3)
PROT SERPL-MCNC: 6.2 G/DL — SIGNIFICANT CHANGE UP (ref 6–8.3)
RBC # BLD: 3.23 M/UL — LOW (ref 3.8–5.2)
RBC # FLD: 13 % — SIGNIFICANT CHANGE UP (ref 10.3–14.5)
SODIUM SERPL-SCNC: 137 MMOL/L — SIGNIFICANT CHANGE UP (ref 135–145)
WBC # BLD: 12.09 K/UL — HIGH (ref 3.8–10.5)
WBC # FLD AUTO: 12.09 K/UL — HIGH (ref 3.8–10.5)

## 2023-07-29 PROCEDURE — 99233 SBSQ HOSP IP/OBS HIGH 50: CPT | Mod: GC

## 2023-07-29 RX ORDER — IBUPROFEN 200 MG
400 TABLET ORAL EVERY 8 HOURS
Refills: 0 | Status: DISCONTINUED | OUTPATIENT
Start: 2023-07-29 | End: 2023-07-30

## 2023-07-29 RX ADMIN — Medication 20 MILLIGRAM(S): at 11:11

## 2023-07-29 RX ADMIN — Medication 650 MILLIGRAM(S): at 04:08

## 2023-07-29 RX ADMIN — Medication 400 MILLIGRAM(S): at 19:29

## 2023-07-29 RX ADMIN — Medication 400 MILLIGRAM(S): at 09:46

## 2023-07-29 RX ADMIN — Medication 400 MILLIGRAM(S): at 08:46

## 2023-07-29 RX ADMIN — ENOXAPARIN SODIUM 40 MILLIGRAM(S): 100 INJECTION SUBCUTANEOUS at 06:20

## 2023-07-29 RX ADMIN — Medication 650 MILLIGRAM(S): at 03:08

## 2023-07-29 RX ADMIN — CEFTRIAXONE 100 MILLIGRAM(S): 500 INJECTION, POWDER, FOR SOLUTION INTRAMUSCULAR; INTRAVENOUS at 13:11

## 2023-07-29 NOTE — PROGRESS NOTE ADULT - PROBLEM SELECTOR PLAN 6
Patient does not have formal diagnosis but was prescribed fluoxetine 20mg qd and alprazolam 0.25 qAM in setting of family stressors.     Plan:  - C/w fluoxetine 20mg qd  - Can give prn alprazolam if requested

## 2023-07-29 NOTE — PROGRESS NOTE ADULT - SUBJECTIVE AND OBJECTIVE BOX
Subjective:  No acute events overnight.  Patient is doing well today without new complaints.  Denies HA, CP, SOB, abdominal pain, nausea, vomiting, fever, chills or diarrhea.     Objective:   Vital Signs Last 24 Hrs  T(C): 36.8 (29 Jul 2023 05:46), Max: 38.1 (28 Jul 2023 13:54)  T(F): 98.2 (29 Jul 2023 05:46), Max: 100.6 (28 Jul 2023 13:54)  HR: 79 (29 Jul 2023 05:46) (67 - 86)  BP: 95/61 (29 Jul 2023 05:46) (88/57 - 96/60)  BP(mean): --  RR: 18 (29 Jul 2023 05:46) (17 - 18)  SpO2: 93% (29 Jul 2023 05:46) (93% - 98%)    Parameters below as of 28 Jul 2023 21:41  Patient On (Oxygen Delivery Method): room air    Physical Exam:   Constitutional: NAD  Eyes: PERRL, EOMI, anicteric sclera  ENT: no nasal discharge; uvula midline, no oropharyngeal erythema or exudates; MMM  Neck: supple; no JVD or thyromegaly  Respiratory: CTA B/L; normal wob, no retractions  Cardiac: +S1/S2; RRR; no M/R/G; PMI non-displaced  Gastrointestinal: soft, mild diffuse tenderness in epigastrium  Back: spine midline, no bony tenderness or step-offs; R>L CVA tenderness  Extremities: WWP, no clubbing or cyanosis; no peripheral edema  Musculoskeletal: NROM x4; no joint swelling, tenderness or erythema  Vascular: 2+ radial, femoral, DP/PT pulses B/L  Dermatologic: skin warm, dry and intact; no rashes, wounds, or scars  Lymphatic: no submandibular or cervical LAD  Neurologic: AAOx3; CNII-XII grossly intact; no focal deficits  Psychiatric: affect and characteristics of appearance, verbalizations, behaviors are appropriate    Labs:                        10.3   12.09 )-----------( 234      ( 29 Jul 2023 05:30 )             31.7       07-29    137  |  108  |  11  ----------------------------<  104<H>  4.5   |  20<L>  |  0.83    Ca    8.4      29 Jul 2023 05:30  Phos  2.9     07-29  Mg     1.9     07-29    TPro  6.2  /  Alb  3.0<L>  /  TBili  0.2  /  DBili  x   /  AST  16  /  ALT  11  /  AlkPhos  70  07-29      Microbiology:     Culture - Blood (collected 07-28-23 @ 14:17)  Source: .Blood Blood-Peripheral  Preliminary Report (07-29-23 @ 04:01):    No growth at 12 hours    Culture - Blood (collected 07-28-23 @ 14:17)  Source: .Blood Blood-Peripheral  Preliminary Report (07-29-23 @ 04:01):    No growth at 12 hours         Medications:  MEDICATIONS  (STANDING):  cefTRIAXone   IVPB 1000 milliGRAM(s) IV Intermittent every 24 hours  enoxaparin Injectable 40 milliGRAM(s) SubCutaneous every 24 hours  FLUoxetine 20 milliGRAM(s) Oral daily    MEDICATIONS  (PRN):  acetaminophen     Tablet .. 650 milliGRAM(s) Oral every 6 hours PRN Temp greater or equal to 38C (100.4F), Mild Pain (1 - 3)  ibuprofen  Tablet. 400 milliGRAM(s) Oral every 8 hours PRN Moderate Pain (4 - 6)

## 2023-07-29 NOTE — PROGRESS NOTE ADULT - PROBLEM SELECTOR PLAN 7
Fluids: S/p 3L IVF in ED  Electrolytes: replete to K>4 or Mg>2  Diet: dash  DVT PPx: Lovenox 40mg QD  GI PPx:   Code: Full  Dispo: [facility] then home

## 2023-07-29 NOTE — PROGRESS NOTE ADULT - PROBLEM SELECTOR PLAN 3
Na 127 on admission. Patient reporting poor PO intake. Euvolemic exam. Calculated sOsms 268, c/w hypotonic hyponatremia. Ddx: hypovolemic iso poor PO intake and diarrheal losses vs. SIADH (can be associated with fluoxetine).     Plan:  - F/u urine osms, urine lytes

## 2023-07-29 NOTE — PROGRESS NOTE ADULT - PROBLEM SELECTOR PLAN 1
Patient meets 2/4 SIRS criteria with T100.6 and WB 14.7. Does not meet criteria for severe sepsis given lactate 1.9, no signs of end-organ perfusion on exam, pressures >90/60. Most likely source is pyelonephritis vs. cystitis/ureteritis. S/p 3L fluids in ED.    Plan:  - C/w CTX 1g qd for empiric pyelonephritis vs. complicated cystitis  - BCx: NGTD  - UCx: GNR, follow up speciation and sensitivities   - Tylenol 650 q6 prn for pain/fever

## 2023-07-30 ENCOUNTER — TRANSCRIPTION ENCOUNTER (OUTPATIENT)
Age: 68
End: 2023-07-30

## 2023-07-30 VITALS
DIASTOLIC BLOOD PRESSURE: 51 MMHG | OXYGEN SATURATION: 94 % | TEMPERATURE: 98 F | SYSTOLIC BLOOD PRESSURE: 104 MMHG | RESPIRATION RATE: 18 BRPM | HEART RATE: 62 BPM

## 2023-07-30 LAB
-  AMPICILLIN/SULBACTAM: SIGNIFICANT CHANGE UP
-  AMPICILLIN/SULBACTAM: SIGNIFICANT CHANGE UP
-  AMPICILLIN: SIGNIFICANT CHANGE UP
-  AZTREONAM: SIGNIFICANT CHANGE UP
-  CEFAZOLIN: SIGNIFICANT CHANGE UP
-  CEFTRIAXONE: SIGNIFICANT CHANGE UP
-  CEFTRIAXONE: SIGNIFICANT CHANGE UP
-  CIPROFLOXACIN: SIGNIFICANT CHANGE UP
-  CIPROFLOXACIN: SIGNIFICANT CHANGE UP
-  ERTAPENEM: SIGNIFICANT CHANGE UP
-  GENTAMICIN: SIGNIFICANT CHANGE UP
-  GENTAMICIN: SIGNIFICANT CHANGE UP
-  MEROPENEM: SIGNIFICANT CHANGE UP
-  MINOCYCLINE: SIGNIFICANT CHANGE UP
-  NITROFURANTOIN: SIGNIFICANT CHANGE UP
-  PIPERACILLIN/TAZOBACTAM: SIGNIFICANT CHANGE UP
-  PIPERACILLIN/TAZOBACTAM: SIGNIFICANT CHANGE UP
-  TOBRAMYCIN: SIGNIFICANT CHANGE UP
-  TOBRAMYCIN: SIGNIFICANT CHANGE UP
-  TRIMETHOPRIM/SULFAMETHOXAZOLE: SIGNIFICANT CHANGE UP
-  TRIMETHOPRIM/SULFAMETHOXAZOLE: SIGNIFICANT CHANGE UP
ANION GAP SERPL CALC-SCNC: 11 MMOL/L — SIGNIFICANT CHANGE UP (ref 5–17)
BASOPHILS # BLD AUTO: 0.04 K/UL — SIGNIFICANT CHANGE UP (ref 0–0.2)
BASOPHILS NFR BLD AUTO: 0.5 % — SIGNIFICANT CHANGE UP (ref 0–2)
BUN SERPL-MCNC: 10 MG/DL — SIGNIFICANT CHANGE UP (ref 7–23)
CALCIUM SERPL-MCNC: 8.5 MG/DL — SIGNIFICANT CHANGE UP (ref 8.4–10.5)
CHLORIDE SERPL-SCNC: 105 MMOL/L — SIGNIFICANT CHANGE UP (ref 96–108)
CO2 SERPL-SCNC: 22 MMOL/L — SIGNIFICANT CHANGE UP (ref 22–31)
CREAT SERPL-MCNC: 0.71 MG/DL — SIGNIFICANT CHANGE UP (ref 0.5–1.3)
CULTURE RESULTS: SIGNIFICANT CHANGE UP
EGFR: 92 ML/MIN/1.73M2 — SIGNIFICANT CHANGE UP
EOSINOPHIL # BLD AUTO: 0.22 K/UL — SIGNIFICANT CHANGE UP (ref 0–0.5)
EOSINOPHIL NFR BLD AUTO: 2.6 % — SIGNIFICANT CHANGE UP (ref 0–6)
GLUCOSE SERPL-MCNC: 125 MG/DL — HIGH (ref 70–99)
HCT VFR BLD CALC: 32.3 % — LOW (ref 34.5–45)
HGB BLD-MCNC: 10.6 G/DL — LOW (ref 11.5–15.5)
IMM GRANULOCYTES NFR BLD AUTO: 0.4 % — SIGNIFICANT CHANGE UP (ref 0–0.9)
LYMPHOCYTES # BLD AUTO: 1.63 K/UL — SIGNIFICANT CHANGE UP (ref 1–3.3)
LYMPHOCYTES # BLD AUTO: 19.3 % — SIGNIFICANT CHANGE UP (ref 13–44)
MAGNESIUM SERPL-MCNC: 1.8 MG/DL — SIGNIFICANT CHANGE UP (ref 1.6–2.6)
MCHC RBC-ENTMCNC: 31.7 PG — SIGNIFICANT CHANGE UP (ref 27–34)
MCHC RBC-ENTMCNC: 32.8 GM/DL — SIGNIFICANT CHANGE UP (ref 32–36)
MCV RBC AUTO: 96.7 FL — SIGNIFICANT CHANGE UP (ref 80–100)
METHOD TYPE: SIGNIFICANT CHANGE UP
METHOD TYPE: SIGNIFICANT CHANGE UP
MONOCYTES # BLD AUTO: 0.91 K/UL — HIGH (ref 0–0.9)
MONOCYTES NFR BLD AUTO: 10.8 % — SIGNIFICANT CHANGE UP (ref 2–14)
NEUTROPHILS # BLD AUTO: 5.6 K/UL — SIGNIFICANT CHANGE UP (ref 1.8–7.4)
NEUTROPHILS NFR BLD AUTO: 66.4 % — SIGNIFICANT CHANGE UP (ref 43–77)
NRBC # BLD: 0 /100 WBCS — SIGNIFICANT CHANGE UP (ref 0–0)
ORGANISM # SPEC MICROSCOPIC CNT: SIGNIFICANT CHANGE UP
PHOSPHATE SERPL-MCNC: 4.3 MG/DL — SIGNIFICANT CHANGE UP (ref 2.5–4.5)
PLATELET # BLD AUTO: 258 K/UL — SIGNIFICANT CHANGE UP (ref 150–400)
POTASSIUM SERPL-MCNC: 3.8 MMOL/L — SIGNIFICANT CHANGE UP (ref 3.5–5.3)
POTASSIUM SERPL-SCNC: 3.8 MMOL/L — SIGNIFICANT CHANGE UP (ref 3.5–5.3)
RBC # BLD: 3.34 M/UL — LOW (ref 3.8–5.2)
RBC # FLD: 13 % — SIGNIFICANT CHANGE UP (ref 10.3–14.5)
SODIUM SERPL-SCNC: 138 MMOL/L — SIGNIFICANT CHANGE UP (ref 135–145)
SPECIMEN SOURCE: SIGNIFICANT CHANGE UP
WBC # BLD: 8.43 K/UL — SIGNIFICANT CHANGE UP (ref 3.8–10.5)
WBC # FLD AUTO: 8.43 K/UL — SIGNIFICANT CHANGE UP (ref 3.8–10.5)

## 2023-07-30 PROCEDURE — 83735 ASSAY OF MAGNESIUM: CPT

## 2023-07-30 PROCEDURE — 85025 COMPLETE CBC W/AUTO DIFF WBC: CPT

## 2023-07-30 PROCEDURE — 99285 EMERGENCY DEPT VISIT HI MDM: CPT | Mod: 25

## 2023-07-30 PROCEDURE — 74177 CT ABD & PELVIS W/CONTRAST: CPT | Mod: MA

## 2023-07-30 PROCEDURE — 87086 URINE CULTURE/COLONY COUNT: CPT

## 2023-07-30 PROCEDURE — 80048 BASIC METABOLIC PNL TOTAL CA: CPT

## 2023-07-30 PROCEDURE — 96367 TX/PROPH/DG ADDL SEQ IV INF: CPT

## 2023-07-30 PROCEDURE — 80053 COMPREHEN METABOLIC PANEL: CPT

## 2023-07-30 PROCEDURE — 83930 ASSAY OF BLOOD OSMOLALITY: CPT

## 2023-07-30 PROCEDURE — 96361 HYDRATE IV INFUSION ADD-ON: CPT

## 2023-07-30 PROCEDURE — 83605 ASSAY OF LACTIC ACID: CPT

## 2023-07-30 PROCEDURE — 96365 THER/PROPH/DIAG IV INF INIT: CPT

## 2023-07-30 PROCEDURE — 84100 ASSAY OF PHOSPHORUS: CPT

## 2023-07-30 PROCEDURE — 99239 HOSP IP/OBS DSCHRG MGMT >30: CPT | Mod: GC

## 2023-07-30 PROCEDURE — 87186 SC STD MICRODIL/AGAR DIL: CPT

## 2023-07-30 PROCEDURE — 83690 ASSAY OF LIPASE: CPT

## 2023-07-30 PROCEDURE — 36415 COLL VENOUS BLD VENIPUNCTURE: CPT

## 2023-07-30 PROCEDURE — 86803 HEPATITIS C AB TEST: CPT

## 2023-07-30 PROCEDURE — 96366 THER/PROPH/DIAG IV INF ADDON: CPT

## 2023-07-30 PROCEDURE — 87040 BLOOD CULTURE FOR BACTERIA: CPT

## 2023-07-30 PROCEDURE — 81001 URINALYSIS AUTO W/SCOPE: CPT

## 2023-07-30 RX ORDER — POTASSIUM CHLORIDE 20 MEQ
20 PACKET (EA) ORAL ONCE
Refills: 0 | Status: COMPLETED | OUTPATIENT
Start: 2023-07-30 | End: 2023-07-30

## 2023-07-30 RX ORDER — MAGNESIUM SULFATE 500 MG/ML
1 VIAL (ML) INJECTION ONCE
Refills: 0 | Status: COMPLETED | OUTPATIENT
Start: 2023-07-30 | End: 2023-07-30

## 2023-07-30 RX ORDER — FLUOXETINE HCL 10 MG
1 CAPSULE ORAL
Refills: 0 | DISCHARGE

## 2023-07-30 RX ADMIN — Medication 20 MILLIEQUIVALENT(S): at 11:30

## 2023-07-30 RX ADMIN — Medication 400 MILLIGRAM(S): at 09:20

## 2023-07-30 RX ADMIN — Medication 400 MILLIGRAM(S): at 10:20

## 2023-07-30 RX ADMIN — Medication 100 GRAM(S): at 11:30

## 2023-07-30 RX ADMIN — Medication 20 MILLIGRAM(S): at 11:17

## 2023-07-30 RX ADMIN — CEFTRIAXONE 100 MILLIGRAM(S): 500 INJECTION, POWDER, FOR SOLUTION INTRAMUSCULAR; INTRAVENOUS at 12:35

## 2023-07-30 RX ADMIN — ENOXAPARIN SODIUM 40 MILLIGRAM(S): 100 INJECTION SUBCUTANEOUS at 06:46

## 2023-07-30 NOTE — DISCHARGE NOTE PROVIDER - NSDCMRMEDTOKEN_GEN_ALL_CORE_FT
Bactrim  mg-160 mg oral tablet: 1 tab(s) orally 2 times a day  FLUoxetine 20 mg oral capsule: 1 orally once a day

## 2023-07-30 NOTE — DISCHARGE NOTE PROVIDER - ATTENDING DISCHARGE PHYSICAL EXAMINATION:
-Gen: NAD, resting in chair  -HEENT: EOMI, PERRL, no scleral icterus, no JVD  -CV: normal S1 and S2  -Lungs: CTABL, normal respiratory effort on RA  -Ab: soft, NT, ND, normal BS  -: no CVA tenderness  -Ext: no LE edema  -Neuro: A&O x 3, no focal deficits

## 2023-07-30 NOTE — DISCHARGE NOTE PROVIDER - HOSPITAL COURSE
68F with depression presenting with 3 days of abd pain, fever, diarrhea with sepsis and R pyelonephritis/ureteritis now admitted to regional medical floor for IV abx management with likely transition to PO abx pending clinical improvement.     # Acute sepsis.   RESOLVED.  On admissoin, patient met 2/4 SIRS criteria with T100.6 and WB 14.7 likely source pyelonephritis. Llactate 1.9, no signs of end-organ perfusion on exam, pressures >90/60.  S/p 3L fluids in ED. Treated with Ceftriaxone in hospital, UCx with E.Coli sensitive to Bactrim.  BCx: NGTD  - Continue Bactrim DS daily for 14 days  - Tylenol 650 q6 prn for pain/fever.    # Pyelonephritis.   Patient with R>L CVA tenderness and pyelonephritis on imaging.   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    #Hyponatremia.   ·  Plan: Na 127 on admission. Patient reporting poor PO intake. Euvolemic exam. Calculated sOsms 268, c/w hypotonic hyponatremia. Ddx: hypovolemic iso poor PO intake and diarrheal losses vs. SIADH (can be associated with fluoxetine).     Plan:  - F/u urine osms, urine lytes.     Problem/Plan - 4:  ·  Problem: Abdominal pain.   ·  Plan: Patient with 3 days of crampy intermittent abdominal pain, assoc with lower back pain and R>L CVA tenderness. Most likely 2/2 to pyelonephritis. Lower concern for: pancreatitis (negative lipase), cystitis (no urinary symptoms, no suprapubic tenderness), kidney stone (not seen on imaging).     Plan:  - See above.     Problem/Plan - 5:  ·  Problem: Acute cystitis.   ·  Plan: Patient with infectious UA but no urinary symptoms.    Plan:  -See above.     Problem/Plan - 6:  ·  Problem: Anxiety and depression.   ·  Plan: Patient does not have formal diagnosis but was prescribed fluoxetine 20mg qd and alprazolam 0.25 qAM in setting of family stressors.     Plan:  - C/w fluoxetine 20mg qd  - Can give prn alprazolam if requested.     68F with depression presenting with 3 days of abd pain, fever, diarrhea with sepsis and R pyelonephritis/ureteritis now admitted to regional medical floor for IV abx management with likely transition to PO abx pending clinical improvement.     # Acute sepsis.   RESOLVED.  On admissoin, patient met 2/4 SIRS criteria with T100.6 and WB 14.7 likely source pyelonephritis. Llactate 1.9, no signs of end-organ perfusion on exam, pressures >90/60.  S/p 3L fluids in ED. Treated with Ceftriaxone in hospital, UCx with E.Coli sensitive to Bactrim.  BCx: NGTD.  Patient now afebrile, leukocytosis resolved.  - Continue Bactrim DS daily for 14 days  - Tylenol 650 q6 prn for pain/fever.    # Pyelonephritis.   Patient with R>L CVA tenderness and pyelonephritis on imaging.   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    # Hyponatremia.   RESOLVED. Na 127 on admission. Patient reporting poor PO intake. Euvolemic exam. Calculated sOsms 268, c/w hypotonic hyponatremia. Ddx: hypovolemic iso poor PO intake and diarrheal losses vs. SIADH (can be associated with fluoxetine).     # Abdominal pain.   Patient with 3 days of crampy intermittent abdominal pain, assoc with lower back pain and R>L CVA tenderness. Most likely 2/2 to pyelonephritis. Lower concern for: pancreatitis (negative lipase), cystitis (no urinary symptoms, no suprapubic tenderness), kidney stone (not seen on imaging).   - Treat pyelonephritis as above    # Anxiety and depression.   Patient does not have formal diagnosis but was prescribed fluoxetine 20mg qd and alprazolam 0.25 qAM in setting of family stressors.   - C/w fluoxetine 20mg qd      Patient was discharged to: home  New medications: Bactrim DS one tablet daily for 14 days   Changes to old medications: none  Medications that were stopped: none  Items to Follow up as outpatient: physical exam to see resolution      68F with depression presenting with 3 days of abd pain, fever, diarrhea with sepsis and R pyelonephritis/ureteritis now admitted to regional medical floor for IV abx management with likely transition to PO abx pending clinical improvement.     # Acute sepsis.   RESOLVED.  On admissoin, patient met 2/4 SIRS criteria with T100.6 and WB 14.7 likely source pyelonephritis. Llactate 1.9, no signs of end-organ perfusion on exam, pressures >90/60.  S/p 3L fluids in ED. Treated with Ceftriaxone in hospital, UCx with E.Coli sensitive to Bactrim.  BCx: NGTD.  Patient now afebrile, leukocytosis resolved.  - Continue Bactrim DS daily for 14 days  - Tylenol 650 q6 prn for pain/fever.    # Pyelonephritis.   Patient with R>L CVA tenderness and pyelonephritis on imaging.   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    # Hyponatremia.   RESOLVED. Na 127 on admission. Patient reporting poor PO intake. Euvolemic exam. Calculated sOsms 268, c/w hypotonic hyponatremia. Ddx: hypovolemic iso poor PO intake and diarrheal losses vs. SIADH (can be associated with fluoxetine).     # Abdominal pain.   Patient with 3 days of crampy intermittent abdominal pain, assoc with lower back pain and R>L CVA tenderness. Most likely 2/2 to pyelonephritis. Lower concern for: pancreatitis (negative lipase), cystitis (no urinary symptoms, no suprapubic tenderness), kidney stone (not seen on imaging).   - Treat pyelonephritis as above    # Anxiety and depression.   Patient does not have formal diagnosis but was prescribed fluoxetine 20mg qd and alprazolam 0.25 qAM in setting of family stressors.   - C/w fluoxetine 20mg qd      Patient was discharged to: home  New medications: Bactrim DS one tablet 2x/day for 14 days   Changes to old medications: none  Medications that were stopped: none  Items to Follow up as outpatient: physical exam to see resolution

## 2023-07-30 NOTE — DISCHARGE NOTE NURSING/CASE MANAGEMENT/SOCIAL WORK - PATIENT PORTAL LINK FT
You can access the FollowMyHealth Patient Portal offered by [facility] by registering at the following website: [email or web address]. By joining [employer]’s FollowMyHealth portal, you will also be able to view your health information using other applications (apps) compatible with our system.

## 2023-07-30 NOTE — DISCHARGE NOTE PROVIDER - CARE PROVIDER_API CALL
Arie Domínguez  Gastroenterology  [address], [address]  [place] [address]  Phone: [phone]  Fax: [phone]  Follow Up Time: 2 weeks

## 2023-07-30 NOTE — DISCHARGE NOTE PROVIDER - NSDCCPCAREPLAN_GEN_ALL_CORE_FT
PRINCIPAL DISCHARGE DIAGNOSIS  Diagnosis: Pyelonephritis  Assessment and Plan of Treatment: Urinary tract infections, also called "UTIs," are infections that affect either the bladder or the kidneys. Bladder infections are more common than kidney infections. Bladder infections happen when bacteria get into the urethra and travel up into the bladder. Kidney infections happen when the bacteria travel even higher, up into the kidneys. The symptoms of a bladder infection include pain or a burning feeling when you urinate, the need to urinate often, the need to urinate suddenly or in a hurry, blood in the urine. Signs that an infection has spread to the kidneys include fever, back pain, or nausea/vomiting. It is important that you take your antibiotics as prescribed and to completion to properly treat your urinary tract infection and prevent antibiotic resistance.  *** Take bactrim double strength tablet every 12 hours for 13 days        SECONDARY DISCHARGE DIAGNOSES  Diagnosis: Renal angiomyolipoma  Assessment and Plan of Treatment: During this hospitalization you had a CT scan to help identify the source of your symptoms.  The CT had an incidental findnig of left kidney angiomylipoma, which is a condition in which benign tumors form in your kidney. It is possible that you adolfo not need any intervention, but tumors that grow may need treatment such as embolization or surgery to reduce the risk of bleeding.  Please followup with your primary care physiciant to continue to monitor.

## 2023-07-30 NOTE — DISCHARGE NOTE NURSING/CASE MANAGEMENT/SOCIAL WORK - NSDCPEFALRISK_GEN_ALL_CORE
For information on Fall & Injury Prevention, visit: [email or web address] OR  [email or web address] OR  https://www.cdc.gov/steadi/patient.html

## 2023-08-02 LAB
CULTURE RESULTS: SIGNIFICANT CHANGE UP
CULTURE RESULTS: SIGNIFICANT CHANGE UP
SPECIMEN SOURCE: SIGNIFICANT CHANGE UP
SPECIMEN SOURCE: SIGNIFICANT CHANGE UP

## 2023-08-03 DIAGNOSIS — N28.89 OTHER SPECIFIED DISORDERS OF KIDNEY AND URETER: ICD-10-CM

## 2023-08-03 DIAGNOSIS — N30.00 ACUTE CYSTITIS WITHOUT HEMATURIA: ICD-10-CM

## 2023-08-03 DIAGNOSIS — N12 TUBULO-INTERSTITIAL NEPHRITIS, NOT SPECIFIED AS ACUTE OR CHRONIC: ICD-10-CM

## 2023-08-03 DIAGNOSIS — Z90.49 ACQUIRED ABSENCE OF OTHER SPECIFIED PARTS OF DIGESTIVE TRACT: ICD-10-CM

## 2023-08-03 DIAGNOSIS — F41.9 ANXIETY DISORDER, UNSPECIFIED: ICD-10-CM

## 2023-08-03 DIAGNOSIS — F32.A DEPRESSION, UNSPECIFIED: ICD-10-CM

## 2023-08-03 DIAGNOSIS — D17.71 BENIGN LIPOMATOUS NEOPLASM OF KIDNEY: ICD-10-CM

## 2023-08-03 DIAGNOSIS — A41.9 SEPSIS, UNSPECIFIED ORGANISM: ICD-10-CM

## 2023-08-03 DIAGNOSIS — E22.2 SYNDROME OF INAPPROPRIATE SECRETION OF ANTIDIURETIC HORMONE: ICD-10-CM

## 2023-08-03 DIAGNOSIS — B96.20 UNSPECIFIED ESCHERICHIA COLI [E. COLI] AS THE CAUSE OF DISEASES CLASSIFIED ELSEWHERE: ICD-10-CM

## 2023-08-23 NOTE — ED ADULT TRIAGE NOTE - ARRIVAL FROM
Injection  Injection performed in right ventrogluteal by Ana Gibbs MA. Patient tolerated the procedure well without complications.  08/23/23   Ana Gibbs MA     Home

## 2024-03-20 ENCOUNTER — APPOINTMENT (OUTPATIENT)
Dept: UROLOGY | Facility: CLINIC | Age: 69
End: 2024-03-20

## 2025-02-18 ENCOUNTER — EMERGENCY (EMERGENCY)
Facility: HOSPITAL | Age: 70
LOS: 1 days | Discharge: HOME | End: 2025-02-18
Attending: EMERGENCY MEDICINE | Admitting: EMERGENCY MEDICINE
Payer: COMMERCIAL

## 2025-02-18 VITALS
SYSTOLIC BLOOD PRESSURE: 162 MMHG | TEMPERATURE: 98 F | RESPIRATION RATE: 18 BRPM | DIASTOLIC BLOOD PRESSURE: 88 MMHG | OXYGEN SATURATION: 98 % | HEART RATE: 78 BPM

## 2025-02-18 VITALS
DIASTOLIC BLOOD PRESSURE: 81 MMHG | OXYGEN SATURATION: 97 % | WEIGHT: 132.28 LBS | SYSTOLIC BLOOD PRESSURE: 155 MMHG | HEART RATE: 67 BPM | RESPIRATION RATE: 18 BRPM | TEMPERATURE: 99 F

## 2025-02-18 DIAGNOSIS — Z98.891 HISTORY OF UTERINE SCAR FROM PREVIOUS SURGERY: Chronic | ICD-10-CM

## 2025-02-18 DIAGNOSIS — Z90.49 ACQUIRED ABSENCE OF OTHER SPECIFIED PARTS OF DIGESTIVE TRACT: Chronic | ICD-10-CM

## 2025-02-18 LAB
ALBUMIN SERPL ELPH-MCNC: 4 G/DL — SIGNIFICANT CHANGE UP (ref 3.3–5)
ALP SERPL-CCNC: 76 U/L — SIGNIFICANT CHANGE UP (ref 40–120)
ALT FLD-CCNC: 24 U/L — SIGNIFICANT CHANGE UP (ref 10–45)
ANION GAP SERPL CALC-SCNC: 8 MMOL/L — SIGNIFICANT CHANGE UP (ref 5–17)
APPEARANCE UR: CLEAR — SIGNIFICANT CHANGE UP
AST SERPL-CCNC: 24 U/L — SIGNIFICANT CHANGE UP (ref 10–40)
BASOPHILS # BLD AUTO: 0.04 K/UL — SIGNIFICANT CHANGE UP (ref 0–0.2)
BASOPHILS NFR BLD AUTO: 0.4 % — SIGNIFICANT CHANGE UP (ref 0–2)
BILIRUB DIRECT SERPL-MCNC: 0.1 MG/DL — SIGNIFICANT CHANGE UP (ref 0–0.3)
BILIRUB INDIRECT FLD-MCNC: 0.2 MG/DL — SIGNIFICANT CHANGE UP (ref 0.2–1)
BILIRUB SERPL-MCNC: 0.3 MG/DL — SIGNIFICANT CHANGE UP (ref 0.2–1.2)
BILIRUB UR-MCNC: NEGATIVE — SIGNIFICANT CHANGE UP
BUN SERPL-MCNC: 10 MG/DL — SIGNIFICANT CHANGE UP (ref 7–23)
CALCIUM SERPL-MCNC: 8.8 MG/DL — SIGNIFICANT CHANGE UP (ref 8.4–10.5)
CHLORIDE SERPL-SCNC: 99 MMOL/L — SIGNIFICANT CHANGE UP (ref 96–108)
CO2 SERPL-SCNC: 28 MMOL/L — SIGNIFICANT CHANGE UP (ref 22–31)
COLOR SPEC: YELLOW — SIGNIFICANT CHANGE UP
CREAT SERPL-MCNC: 0.71 MG/DL — SIGNIFICANT CHANGE UP (ref 0.5–1.3)
DIFF PNL FLD: NEGATIVE — SIGNIFICANT CHANGE UP
EGFR: 91 ML/MIN/1.73M2 — SIGNIFICANT CHANGE UP
EOSINOPHIL # BLD AUTO: 0.13 K/UL — SIGNIFICANT CHANGE UP (ref 0–0.5)
EOSINOPHIL NFR BLD AUTO: 1.4 % — SIGNIFICANT CHANGE UP (ref 0–6)
FLUAV AG NPH QL: SIGNIFICANT CHANGE UP
FLUBV AG NPH QL: SIGNIFICANT CHANGE UP
GLUCOSE SERPL-MCNC: 91 MG/DL — SIGNIFICANT CHANGE UP (ref 70–99)
GLUCOSE UR QL: NEGATIVE MG/DL — SIGNIFICANT CHANGE UP
HCT VFR BLD CALC: 35.7 % — SIGNIFICANT CHANGE UP (ref 34.5–45)
HGB BLD-MCNC: 11.3 G/DL — LOW (ref 11.5–15.5)
IMM GRANULOCYTES NFR BLD AUTO: 0.2 % — SIGNIFICANT CHANGE UP (ref 0–0.9)
KETONES UR-MCNC: NEGATIVE MG/DL — SIGNIFICANT CHANGE UP
LEUKOCYTE ESTERASE UR-ACNC: ABNORMAL
LIDOCAIN IGE QN: 22 U/L — SIGNIFICANT CHANGE UP (ref 7–60)
LYMPHOCYTES # BLD AUTO: 2.33 K/UL — SIGNIFICANT CHANGE UP (ref 1–3.3)
LYMPHOCYTES # BLD AUTO: 24.9 % — SIGNIFICANT CHANGE UP (ref 13–44)
MCHC RBC-ENTMCNC: 30.6 PG — SIGNIFICANT CHANGE UP (ref 27–34)
MCHC RBC-ENTMCNC: 31.7 G/DL — LOW (ref 32–36)
MCV RBC AUTO: 96.7 FL — SIGNIFICANT CHANGE UP (ref 80–100)
MONOCYTES # BLD AUTO: 1.01 K/UL — HIGH (ref 0–0.9)
MONOCYTES NFR BLD AUTO: 10.8 % — SIGNIFICANT CHANGE UP (ref 2–14)
NEUTROPHILS # BLD AUTO: 5.81 K/UL — SIGNIFICANT CHANGE UP (ref 1.8–7.4)
NEUTROPHILS NFR BLD AUTO: 62.3 % — SIGNIFICANT CHANGE UP (ref 43–77)
NITRITE UR-MCNC: NEGATIVE — SIGNIFICANT CHANGE UP
NRBC BLD AUTO-RTO: 0 /100 WBCS — SIGNIFICANT CHANGE UP (ref 0–0)
PH UR: 6.5 — SIGNIFICANT CHANGE UP (ref 5–8)
PLATELET # BLD AUTO: 284 K/UL — SIGNIFICANT CHANGE UP (ref 150–400)
POTASSIUM SERPL-MCNC: 3.6 MMOL/L — SIGNIFICANT CHANGE UP (ref 3.5–5.3)
POTASSIUM SERPL-SCNC: 3.6 MMOL/L — SIGNIFICANT CHANGE UP (ref 3.5–5.3)
PROT SERPL-MCNC: 7.1 G/DL — SIGNIFICANT CHANGE UP (ref 6–8.3)
PROT UR-MCNC: NEGATIVE MG/DL — SIGNIFICANT CHANGE UP
RBC # BLD: 3.69 M/UL — LOW (ref 3.8–5.2)
RBC # FLD: 13.3 % — SIGNIFICANT CHANGE UP (ref 10.3–14.5)
RSV RNA NPH QL NAA+NON-PROBE: SIGNIFICANT CHANGE UP
SARS-COV-2 RNA SPEC QL NAA+PROBE: SIGNIFICANT CHANGE UP
SODIUM SERPL-SCNC: 135 MMOL/L — SIGNIFICANT CHANGE UP (ref 135–145)
SP GR SPEC: 1.01 — SIGNIFICANT CHANGE UP (ref 1–1.03)
UROBILINOGEN FLD QL: 0.2 MG/DL — SIGNIFICANT CHANGE UP (ref 0.2–1)
WBC # BLD: 9.34 K/UL — SIGNIFICANT CHANGE UP (ref 3.8–10.5)
WBC # FLD AUTO: 9.34 K/UL — SIGNIFICANT CHANGE UP (ref 3.8–10.5)

## 2025-02-18 PROCEDURE — 74177 CT ABD & PELVIS W/CONTRAST: CPT | Mod: MC

## 2025-02-18 PROCEDURE — 74177 CT ABD & PELVIS W/CONTRAST: CPT | Mod: 26

## 2025-02-18 PROCEDURE — 80048 BASIC METABOLIC PNL TOTAL CA: CPT

## 2025-02-18 PROCEDURE — 85025 COMPLETE CBC W/AUTO DIFF WBC: CPT

## 2025-02-18 PROCEDURE — 36415 COLL VENOUS BLD VENIPUNCTURE: CPT

## 2025-02-18 PROCEDURE — 96374 THER/PROPH/DIAG INJ IV PUSH: CPT | Mod: XU

## 2025-02-18 PROCEDURE — 83690 ASSAY OF LIPASE: CPT

## 2025-02-18 PROCEDURE — 80076 HEPATIC FUNCTION PANEL: CPT

## 2025-02-18 PROCEDURE — 99284 EMERGENCY DEPT VISIT MOD MDM: CPT | Mod: 25

## 2025-02-18 PROCEDURE — 99285 EMERGENCY DEPT VISIT HI MDM: CPT

## 2025-02-18 PROCEDURE — 87077 CULTURE AEROBIC IDENTIFY: CPT

## 2025-02-18 PROCEDURE — 81001 URINALYSIS AUTO W/SCOPE: CPT

## 2025-02-18 PROCEDURE — 87086 URINE CULTURE/COLONY COUNT: CPT

## 2025-02-18 PROCEDURE — 87637 SARSCOV2&INF A&B&RSV AMP PRB: CPT

## 2025-02-18 PROCEDURE — 96375 TX/PRO/DX INJ NEW DRUG ADDON: CPT

## 2025-02-18 RX ORDER — FAMOTIDINE 10 MG/ML
1 INJECTION INTRAVENOUS
Qty: 28 | Refills: 0
Start: 2025-02-18 | End: 2025-03-03

## 2025-02-18 RX ORDER — ACETAMINOPHEN 160 MG/5ML
1000 SUSPENSION ORAL ONCE
Refills: 0 | Status: COMPLETED | OUTPATIENT
Start: 2025-02-18 | End: 2025-02-18

## 2025-02-18 RX ORDER — FAMOTIDINE 10 MG/ML
20 INJECTION INTRAVENOUS ONCE
Refills: 0 | Status: COMPLETED | OUTPATIENT
Start: 2025-02-18 | End: 2025-02-18

## 2025-02-18 RX ORDER — ONDANSETRON 4 MG/1
1 TABLET, ORALLY DISINTEGRATING ORAL
Qty: 12 | Refills: 0
Start: 2025-02-18

## 2025-02-18 RX ORDER — ONDANSETRON 4 MG/1
4 TABLET, ORALLY DISINTEGRATING ORAL ONCE
Refills: 0 | Status: COMPLETED | OUTPATIENT
Start: 2025-02-18 | End: 2025-02-18

## 2025-02-18 RX ORDER — BACTERIOSTATIC SODIUM CHLORIDE 0.9 %
1000 VIAL (ML) INJECTION ONCE
Refills: 0 | Status: COMPLETED | OUTPATIENT
Start: 2025-02-18 | End: 2025-02-18

## 2025-02-18 RX ADMIN — Medication 1000 MILLILITER(S): at 16:43

## 2025-02-18 RX ADMIN — ACETAMINOPHEN 400 MILLIGRAM(S): 160 SUSPENSION ORAL at 17:32

## 2025-02-18 RX ADMIN — FAMOTIDINE 20 MILLIGRAM(S): 10 INJECTION INTRAVENOUS at 16:44

## 2025-02-18 RX ADMIN — ONDANSETRON 4 MILLIGRAM(S): 4 TABLET, ORALLY DISINTEGRATING ORAL at 16:43

## 2025-02-18 NOTE — ED ADULT NURSE NOTE - OBJECTIVE STATEMENT
69yr/female presents to ED with c/o multiple episodes of vomiting since Sunday. Patient also reports abdominal discomfort. Denies diarrhea, fever or chills. A&Ox3. Speaking in full sentences.

## 2025-02-18 NOTE — ED PROVIDER NOTE - NSFOLLOWUPINSTRUCTIONS_ED_ALL_ED_FT
Please see your primary care provider for followup.  Call for appointment.  If you have any problems with followup, please call the ED Referral Coordinator at [phone].  Return to the ER if symptoms worsen or other concerns.    Viral Gastroenteritis, Adult  Body outline showing the digestive tract, including the stomach, small intestine, and large intestine.  Viral gastroenteritis is also known as the stomach flu. This condition may affect your stomach, small intestine, and large intestine. It can cause sudden watery diarrhea, fever, and vomiting. This condition is caused by many different viruses. These viruses can be passed from person to person very easily (are contagious).    Diarrhea and vomiting can make you feel weak and cause you to become dehydrated. You may not be able to keep fluids down. Dehydration can make you tired and thirsty, cause you to have a dry mouth, and decrease how often you urinate. It is important to replace the fluids that you lose from diarrhea and vomiting.    What are the causes?  Gastroenteritis is caused by many viruses, including rotavirus and norovirus. Norovirus is the most common cause in adults. You can get sick after being exposed to the viruses from other people. You can also get sick by:  Eating food, drinking water, or touching a surface contaminated with one of these viruses.  Sharing utensils or other personal items with an infected person.  What increases the risk?  You are more likely to develop this condition if you:  Have a weak body defense system (immune system).  Live with one or more children who are younger than 2 years.  Live in a nursing home.  Travel on cruise ships.  What are the signs or symptoms?  Symptoms of this condition start suddenly 1–3 days after exposure to a virus. Symptoms may last for a few days or for as long as a week. Common symptoms include watery diarrhea and vomiting. Other symptoms include:  Fever.  Headache.  Fatigue.  Pain in the abdomen.  Chills.  Weakness.  Nausea.  Muscle aches.  Loss of appetite.  How is this diagnosed?  This condition is diagnosed with a medical history and physical exam. You may also have a stool test to check for viruses or other infections.    How is this treated?  This condition typically goes away on its own. The focus of treatment is to prevent dehydration and restore lost fluids (rehydration). This condition may be treated with:  An oral rehydration solution (ORS) to replace important salts and minerals (electrolytes) in your body. Take this if told by your health care provider. This is a drink that is sold at pharmacies and retail stores.  Medicines to help with your symptoms.  Probiotic supplements to reduce symptoms of diarrhea.  Fluids given through an IV, if dehydration is severe.  Older adults and people with other diseases or a weak immune system are at higher risk for dehydration.    Follow these instructions at home:  Eating and drinking    A comparison of three sample cups showing dark yellow, yellow, and pale yellow urine.  Take an ORS as told by your health care provider.  Drink clear fluids in small amounts as you are able. Clear fluids include:  Water.  Ice chips.  Diluted fruit juice.  Low-calorie sports drinks.  Drink enough fluid to keep your urine pale yellow.  Eat small amounts of healthy foods every 3–4 hours as you are able. This may include whole grains, fruits, vegetables, lean meats, and yogurt.  Avoid fluids that contain a lot of sugar or caffeine, such as energy drinks, sports drinks, and soda.  Avoid spicy or fatty foods.  Avoid alcohol.  General instructions    Washing hands with soap and water.  Wash your hands often, especially after having diarrhea or vomiting. If soap and water are not available, use hand .  Make sure that all people in your household wash their hands well and often.  Take over-the-counter and prescription medicines only as told by your health care provider.  Rest at home while you recover.  Watch your condition for any changes.  Take a warm bath to relieve any burning or pain from frequent diarrhea episodes.  Keep all follow-up visits. This is important.  Contact a health care provider if you:  Cannot keep fluids down.  Have symptoms that get worse.  Have new symptoms.  Feel light-headed or dizzy.  Have muscle cramps.  Get help right away if you:  Have chest pain.  Have trouble breathing or you are breathing very quickly.  Have a fast heartbeat.  Feel extremely weak or you faint.  Have a severe headache, a stiff neck, or both.  Have a rash.  Have severe pain, cramping, or bloating in your abdomen.  Have skin that feels cold and clammy.  Feel confused.  Have pain when you urinate.  Have signs of dehydration, such as:  Dark urine, very little urine, or no urine.  Cracked lips.  Dry mouth.  Sunken eyes.  Sleepiness.  Weakness.  Have signs of bleeding, such as:  Seeing blood in your vomit.  Having vomit that looks like coffee grounds.  Having bloody or black stools or stools that look like tar.  These symptoms may be an emergency. Get help right away. Call 911.  Do not wait to see if the symptoms will go away.  Do not drive yourself to the hospital.  Summary  Viral gastroenteritis is also known as the stomach flu. It can cause sudden watery diarrhea, fever, and vomiting.  This condition can be passed from person to person very easily (is contagious).  Take an oral rehydration solution (ORS) if told by your health care provider. This is a drink that is sold at pharmacies and retail stores.  Wash your hands often, especially after having diarrhea or vomiting. If soap and water are not available, use hand .  This information is not intended to replace advice given to you by your health care provider. Make sure you discuss any questions you have with your health care provider.

## 2025-02-18 NOTE — ED PROVIDER NOTE - PATIENT PORTAL LINK FT
You can access the FollowMyHealth Patient Portal offered by [facility] by registering at the following website: [email or web address]. By joining [employer]’s FollowMyHealth portal, you will also be able to view your health information using other applications (apps) compatible with our system.

## 2025-02-18 NOTE — ED ADULT NURSE NOTE - DOES PATIENT HAVE ADVANCE DIRECTIVE
Janette w/ Mr. Omre and informed him pt will need to reschedule MWV that is not due until after 3/22/24 and to have fasting labs drawn at least 1 week prior to appt    No

## 2025-02-18 NOTE — ED PROVIDER NOTE - CLINICAL SUMMARY MEDICAL DECISION MAKING FREE TEXT BOX
pt c/o n/v/d x few d, no fevers/anorexia, well appearing, non toxic, afebrile, benign abd, labs wnl, ? food bourne vs viral etiology, no clinical concern for acute intra abd process - ct pt c/o n/v/d x few d, no fevers/anorexia, well appearing, non toxic, afebrile, benign abd, labs wnl, ? food bourne vs viral etiology, no clinical concern for acute intra abd process - ct done, pt signed out to dr parker pending results and dispo

## 2025-02-18 NOTE — ED PROVIDER NOTE - OBJECTIVE STATEMENT
The pt is a 68 y/o F, who presents to ED c/o n/v/d x 2 d. States that is unable to keep po down, feeling weak, some upper abd cramping w/emesis, states that emesis is non bloody, non bilious, diarrhea is loose BMs. Recently traveled to [place]. Denies cp, sob, dizziness, syncope, dysuria, fevers, chills.

## 2025-02-20 DIAGNOSIS — K52.9 NONINFECTIVE GASTROENTERITIS AND COLITIS, UNSPECIFIED: ICD-10-CM

## 2025-02-20 DIAGNOSIS — R11.2 NAUSEA WITH VOMITING, UNSPECIFIED: ICD-10-CM
